# Patient Record
Sex: FEMALE | Race: WHITE
[De-identification: names, ages, dates, MRNs, and addresses within clinical notes are randomized per-mention and may not be internally consistent; named-entity substitution may affect disease eponyms.]

---
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
## 2020-01-09 ENCOUNTER — TRANSCRIPTION ENCOUNTER (OUTPATIENT)
Age: 20
End: 2020-01-09

## 2020-02-27 ENCOUNTER — TRANSCRIPTION ENCOUNTER (OUTPATIENT)
Age: 20
End: 2020-02-27

## 2020-04-16 NOTE — H&P PEDIATRIC - HISTORY OF PRESENT ILLNESS
15 y/o F with no PMHx presents to the ED c/o worsening R sided abd pain over the past 2 days. Pt states that she has been eating, drinking with no worsening of the pain.no diarrhea, no dysuria, no fever, no hematuria, no nausea, no vomiting.
Unknown

## 2020-09-25 NOTE — PRE-OP CHECKLIST, PEDIATRIC - NEEDLE GAUGE
[de-identified] : 58 yo female with fever, llq abdominal pain for the past few days.  Now fever resolved and she took fiber and reports large bm this am.  States pain is much improved. She did has some constipation prior to this pain.  Was seen in urgent care and tested negative for covid. Denies hx of diverticulitis in past.  Last colonoscopy was in 2015. 
20

## 2021-04-07 ENCOUNTER — APPOINTMENT (OUTPATIENT)
Dept: OTOLARYNGOLOGY | Facility: CLINIC | Age: 21
End: 2021-04-07

## 2021-04-07 PROBLEM — N83.209 UNSPECIFIED OVARIAN CYST, UNSPECIFIED SIDE: Chronic | Status: ACTIVE | Noted: 2019-08-18

## 2021-04-07 PROBLEM — I34.1 NONRHEUMATIC MITRAL (VALVE) PROLAPSE: Chronic | Status: ACTIVE | Noted: 2019-08-18

## 2021-04-09 ENCOUNTER — APPOINTMENT (OUTPATIENT)
Dept: OTOLARYNGOLOGY | Facility: CLINIC | Age: 21
End: 2021-04-09

## 2021-04-12 ENCOUNTER — APPOINTMENT (OUTPATIENT)
Dept: CT IMAGING | Facility: CLINIC | Age: 21
End: 2021-04-12
Payer: COMMERCIAL

## 2021-04-12 ENCOUNTER — OUTPATIENT (OUTPATIENT)
Dept: OUTPATIENT SERVICES | Facility: HOSPITAL | Age: 21
LOS: 1 days | End: 2021-04-12
Payer: COMMERCIAL

## 2021-04-12 ENCOUNTER — TRANSCRIPTION ENCOUNTER (OUTPATIENT)
Age: 21
End: 2021-04-12

## 2021-04-12 DIAGNOSIS — Z00.8 ENCOUNTER FOR OTHER GENERAL EXAMINATION: ICD-10-CM

## 2021-04-12 DIAGNOSIS — Z90.49 ACQUIRED ABSENCE OF OTHER SPECIFIED PARTS OF DIGESTIVE TRACT: Chronic | ICD-10-CM

## 2021-04-12 PROCEDURE — 70486 CT MAXILLOFACIAL W/O DYE: CPT

## 2021-04-12 PROCEDURE — 70486 CT MAXILLOFACIAL W/O DYE: CPT | Mod: 26

## 2021-04-16 ENCOUNTER — APPOINTMENT (OUTPATIENT)
Dept: CT IMAGING | Facility: CLINIC | Age: 21
End: 2021-04-16

## 2021-04-16 ENCOUNTER — OUTPATIENT (OUTPATIENT)
Dept: OUTPATIENT SERVICES | Facility: HOSPITAL | Age: 21
LOS: 1 days | End: 2021-04-16

## 2021-04-16 DIAGNOSIS — Z90.49 ACQUIRED ABSENCE OF OTHER SPECIFIED PARTS OF DIGESTIVE TRACT: Chronic | ICD-10-CM

## 2021-04-16 DIAGNOSIS — Z00.8 ENCOUNTER FOR OTHER GENERAL EXAMINATION: ICD-10-CM

## 2021-08-03 ENCOUNTER — APPOINTMENT (OUTPATIENT)
Dept: INTERNAL MEDICINE | Facility: CLINIC | Age: 21
End: 2021-08-03

## 2021-10-08 ENCOUNTER — TRANSCRIPTION ENCOUNTER (OUTPATIENT)
Age: 21
End: 2021-10-08

## 2022-04-26 ENCOUNTER — TRANSCRIPTION ENCOUNTER (OUTPATIENT)
Age: 22
End: 2022-04-26

## 2022-05-03 ENCOUNTER — APPOINTMENT (OUTPATIENT)
Dept: ULTRASOUND IMAGING | Facility: CLINIC | Age: 22
End: 2022-05-03
Payer: COMMERCIAL

## 2022-05-03 ENCOUNTER — OUTPATIENT (OUTPATIENT)
Dept: OUTPATIENT SERVICES | Facility: HOSPITAL | Age: 22
LOS: 1 days | End: 2022-05-03
Payer: COMMERCIAL

## 2022-05-03 DIAGNOSIS — Z00.8 ENCOUNTER FOR OTHER GENERAL EXAMINATION: ICD-10-CM

## 2022-05-03 DIAGNOSIS — Z90.49 ACQUIRED ABSENCE OF OTHER SPECIFIED PARTS OF DIGESTIVE TRACT: Chronic | ICD-10-CM

## 2022-05-03 PROCEDURE — 76817 TRANSVAGINAL US OBSTETRIC: CPT

## 2022-05-03 PROCEDURE — 76801 OB US < 14 WKS SINGLE FETUS: CPT

## 2022-05-03 PROCEDURE — 76801 OB US < 14 WKS SINGLE FETUS: CPT | Mod: 26

## 2022-05-03 PROCEDURE — 76817 TRANSVAGINAL US OBSTETRIC: CPT | Mod: 26

## 2022-05-04 ENCOUNTER — APPOINTMENT (OUTPATIENT)
Dept: ULTRASOUND IMAGING | Facility: CLINIC | Age: 22
End: 2022-05-04

## 2022-06-15 NOTE — DISCHARGE NOTE ADULT - NS MD DC FALL RISK RISK
For information on Fall & Injury Prevention, visit [email or web address] Mustarde Flap Text: The defect edges were debeveled with a #15 scalpel blade.  Given the size, depth and location of the defect and the proximity to free margins a Mustarde flap was deemed most appropriate.  Using a sterile surgical marker, an appropriate flap was drawn incorporating the defect. The area thus outlined was incised with a #15 scalpel blade.  The skin margins were undermined to an appropriate distance in all directions utilizing iris scissors.

## 2022-09-26 ENCOUNTER — OUTPATIENT (OUTPATIENT)
Dept: OUTPATIENT SERVICES | Facility: HOSPITAL | Age: 22
LOS: 1 days | End: 2022-09-26
Payer: COMMERCIAL

## 2022-09-26 ENCOUNTER — APPOINTMENT (OUTPATIENT)
Dept: ULTRASOUND IMAGING | Facility: CLINIC | Age: 22
End: 2022-09-26

## 2022-09-26 DIAGNOSIS — R00.0 TACHYCARDIA, UNSPECIFIED: ICD-10-CM

## 2022-09-26 DIAGNOSIS — Z90.49 ACQUIRED ABSENCE OF OTHER SPECIFIED PARTS OF DIGESTIVE TRACT: Chronic | ICD-10-CM

## 2022-09-26 PROCEDURE — 76536 US EXAM OF HEAD AND NECK: CPT

## 2022-09-26 PROCEDURE — 76536 US EXAM OF HEAD AND NECK: CPT | Mod: 26

## 2023-03-15 ENCOUNTER — APPOINTMENT (OUTPATIENT)
Dept: ORTHOPEDIC SURGERY | Facility: CLINIC | Age: 23
End: 2023-03-15

## 2023-03-16 ENCOUNTER — APPOINTMENT (OUTPATIENT)
Dept: ORTHOPEDIC SURGERY | Facility: CLINIC | Age: 23
End: 2023-03-16

## 2023-05-06 ENCOUNTER — APPOINTMENT (OUTPATIENT)
Dept: ULTRASOUND IMAGING | Facility: CLINIC | Age: 23
End: 2023-05-06
Payer: COMMERCIAL

## 2023-05-06 ENCOUNTER — OUTPATIENT (OUTPATIENT)
Dept: OUTPATIENT SERVICES | Facility: HOSPITAL | Age: 23
LOS: 1 days | End: 2023-05-06
Payer: COMMERCIAL

## 2023-05-06 DIAGNOSIS — Z90.49 ACQUIRED ABSENCE OF OTHER SPECIFIED PARTS OF DIGESTIVE TRACT: Chronic | ICD-10-CM

## 2023-05-06 DIAGNOSIS — E04.1 NONTOXIC SINGLE THYROID NODULE: ICD-10-CM

## 2023-05-06 PROCEDURE — 76536 US EXAM OF HEAD AND NECK: CPT

## 2023-05-06 PROCEDURE — 76536 US EXAM OF HEAD AND NECK: CPT | Mod: 26

## 2023-05-12 NOTE — DISCHARGE NOTE ADULT - LAUNCH MEDICATION RECONCILIATION
Tube check 5/26 at 0900 AM at [facility] [address] going instructions for Post CT/Ultrasound  Biopsy/Aspiration/Drainage    No tub baths, hot tubs or swimming for 5 days  May shower after 24 hours  No strenuous activity for 48 hours  Notify doctor if bleeding, increased pain at the site, swelling, bruising, redness or fever  May change Band-Aid daily for 3 days then leave open to air    Additional home going instructions for Drainage Tube  Post CT/Ultrasound  Biopsy/Aspritation/Drainage    Pour 0.9 NaCL solution into sterile specimen cup. Use new cup everyday  Draw up 10 mL of fluid into syringe. New syringe for each flush. Avoid touching the inside of the cup, mouth of the bottle, tip of the syringe or tip of white cap as these areas are to be kept sterile. Change white cap as necessary. Tube is to be flushed daily  Remove cap from side port and connect syringe by twisting motion  Turn stopcock off towards bag. (This allows direct flow between catheter in patient and bag.)  Inject the solution brisky. Pull fluid back slowly but firmly into the syringe  Measure amount of fluid and observe what fluid looks like. Document at completion of this procedure. Turn stopcock off towards side port. (This allows direct flow between catheter in patient and bag)  Remove the syringe and replace cap over side port. Fluid in syringe and leftover solution in bottle cap should be discarded into toilet. Replace lid to cup. Discard syringe into old milk carton/jug and consuelo contaminated for .     Use new syringe each time the flushing procedure is done  Use sterile cup each day  Clear plastic cover is to be used when showering or bathing, not for daily continuous wear      Secure prescription for follow up tube change  Follow up with [facility] appointment        Patient/Family Member Signature: __________________________________      Physician/RN Signature: <<-----Click here for Discharge Medication Review

## 2023-09-10 ENCOUNTER — APPOINTMENT (OUTPATIENT)
Dept: MRI IMAGING | Facility: CLINIC | Age: 23
End: 2023-09-10
Payer: COMMERCIAL

## 2023-09-10 ENCOUNTER — APPOINTMENT (OUTPATIENT)
Dept: ORTHOPEDIC SURGERY | Facility: CLINIC | Age: 23
End: 2023-09-10
Payer: COMMERCIAL

## 2023-09-10 VITALS — BODY MASS INDEX: 20.66 KG/M2 | HEIGHT: 65 IN | WEIGHT: 124 LBS

## 2023-09-10 DIAGNOSIS — M25.562 PAIN IN LEFT KNEE: ICD-10-CM

## 2023-09-10 PROCEDURE — 73562 X-RAY EXAM OF KNEE 3: CPT | Mod: LT

## 2023-09-10 PROCEDURE — 73721 MRI JNT OF LWR EXTRE W/O DYE: CPT | Mod: LT

## 2023-09-10 PROCEDURE — 99204 OFFICE O/P NEW MOD 45 MIN: CPT | Mod: 25

## 2023-09-10 PROCEDURE — E0114: CPT | Mod: NU

## 2023-09-10 RX ORDER — NAPROXEN 500 MG/1
500 TABLET ORAL
Qty: 60 | Refills: 1 | Status: ACTIVE | COMMUNITY
Start: 2023-09-10 | End: 1900-01-01

## 2023-09-10 NOTE — IMAGING
[de-identified] : The patient is a well appearing 22 year old female of their stated age. Patient ambulates with stiff knee normal gait. Negative straight leg raise bilateral  Effected Knee: Left                        	 ROM:   degrees Lachman: Negative Pivot Shift: Negative Anterior Drawer: Negative Posterior Drawer / Sag:Negative Varus Stress 0 degrees: Stable Varus Stress 30 degrees: Stable Valgus Stress 0 degrees: Stable Valgus Stress 30 degrees: Stable Medial Javier: + Lateral Javier: Negative Patella Glide: 2+ Patella Apprehension: Negative Patella Grind: Negative Palpation: Medial Joint Line: Tender Lateral Joint Line: Nontender Medial Collateral Ligament: Nontender Lateral Collateral Ligament/PLC: Nontender Distal Femur: Nontender Proximal Tibia: Nontender Tibial Tubercle: Nontender Distal Pole Patella: Nontender Quadriceps Tendon: Tender &  Intact Patella Tendon: Nontender &  Intact Medial Distal Hamstring/PES: Nontender Lateral Distal Hamstring: Nontender & Stable Iliotibial Band: Nontender Medial Patellofemoral Ligament: Nontender Adductor: Nontender Proximal GSC-Plantaris: Nontender Calf: Supple & Nontender Inspection: Deformity: No Erythema: No Ecchymosis: No Abrasions: No Effusion: Mild Prepatella Bursitis: No Neurologic Exam: Sensation L4-S1: Grossly Intact Motor Exam: Quadriceps: 3 out of 5 Hamstrings: 5 out of 5 EHL: 5 out of 5 FHL: 5 out of 5 TA: 5 out of 5 GS: 5 out of 5 Circulatory/Pulses: Dorsalis Pedis: 2+ Posterior Tibialis: 2+ Additional Pertinent Findings: None Contralateral Knee:                           	 ROM: 0-145 degrees Other Pertinent Findings: None  Assessment: The patient is a 22 year old female with left knee pain and radiographic and physical exam findings consistent with   an acute locked knee, quadriceps tendinitis and possible medial meniscus tear.    The patients condition is acute. Documents/Results Reviewed Today: None Tests/Studies Independently Interpreted Today: Xrays benign Pertinent findings include: TTP medial joint line, + MM, + ttp quad/VMO, loss of motion Confounding medical conditions/concerns: None Plan:  Crutches for WB, STAT MRI Tests Ordered: STAT MRI left knee to eval for locked meniscus tear Prescription Medications Ordered: Naprosyn Braces/DME Ordered: Crutches Activity/Work/Sports Status: Limited Additional Instructions: Ice and elevate Follow-Up: After MRI  The patient's current medication management of their orthopedic diagnosis was reviewed today: (1) We discussed a comprehensive treatment plan that included possible pharmaceutical management involving the use of prescription strength medications including but not limited to options such as oral Naprosyn 500mg BID, once daily Meloxicam 15 mg, or 500-650 mg Tylenol versus over the counter oral medications and topical prescription NSAID Pennsaid vs over the counter Voltaren gel.  Based on our extensive discussion, the patient was prescribed Naprosyn 500mg BID for two weeks.  It will then be used PRN for pain, inflammation and discomfort. (2) There is a moderate risk of morbidity with further treatment, especially from use of prescription strength medications and possible side effects of these medications which include upset stomach with oral medications, skin reactions to topical medications and cardiac/renal issues with long term use. (3) I recommended that the patient follow-up with their medical physician to discuss any significant specific potential issues with long term medication use such as interactions with current medications or with exacerbation of underlying medical comorbidities. (4) The benefits and risks associated with use of injectable, oral or topical, prescription and over the counter anti-inflammatory medications were discussed with the patient. The patient voiced understanding of the risks including but not limited to bleeding, stroke, kidney dysfunction, heart disease, and were referred to the black box warning label for further information.   [Left] : left knee [AP] : anteroposterior [Lateral] : lateral [[place]] : skyline [There are no fractures, subluxations or dislocations. No significant abnormalities are seen] : There are no fractures, subluxations or dislocations. No significant abnormalities are seen

## 2023-09-10 NOTE — HISTORY OF PRESENT ILLNESS
[de-identified] : The patient is a 22 year  old right hand dominant female who presents today complaining of left knee pain.   Date of Injury/Onset: 9/9/23 Pain:    At Rest: 4/10  With Activity:  8-9/10  Mechanism of injury: went to stand up after prolonged sitting and felt pain This is NOT a Work Related Injury being treated under Worker's Compensation. This is NOT an athletic injury occurring associated with an interscholastic or organized sports team. Quality of symptoms: stabbing, instability, sharp Improves with: rest Worse with: walking, standing, terminal knee extensinon Prior treatment: none Prior Imaging: none Out of work/sport: currently working and going to school School/Sport/Position/Occupation: student at [employer] Additional Information: HX: left patella fracture

## 2023-09-12 ENCOUNTER — NON-APPOINTMENT (OUTPATIENT)
Age: 23
End: 2023-09-12

## 2023-09-12 NOTE — ED ADULT TRIAGE NOTE - DIRECT TO ROOM CARE INITIATED:
----- Message from Maricruz Gomez sent at 9/12/2023 10:10 AM EDT -----  Subject: Referral Request    Reason for referral request? Patient requests labs for to check for   rheumatoid arthritis. Ortho doctor advised to check with PCP to have done. Provider patient wants to be referred to(if known):     Provider Phone Number(if known):     Additional Information for Provider?   ---------------------------------------------------------------------------  --------------  [address]    [phone]; OK to leave message on voicemail  ---------------------------------------------------------------------------  --------------
11-Jun-2017 11:10

## 2023-09-14 ENCOUNTER — APPOINTMENT (OUTPATIENT)
Dept: ORTHOPEDIC SURGERY | Facility: CLINIC | Age: 23
End: 2023-09-14

## 2024-02-27 RX ORDER — AMOXICILLIN 250 MG/5ML
1 SUSPENSION, RECONSTITUTED, ORAL (ML) ORAL
Refills: 0 | DISCHARGE
Start: 2024-02-27 | End: 2024-03-08

## 2024-03-01 ENCOUNTER — INPATIENT (INPATIENT)
Facility: HOSPITAL | Age: 24
LOS: 1 days | Discharge: HOME | DRG: 392 | End: 2024-03-03
Attending: STUDENT IN AN ORGANIZED HEALTH CARE EDUCATION/TRAINING PROGRAM | Admitting: INTERNAL MEDICINE
Payer: COMMERCIAL

## 2024-03-01 VITALS
WEIGHT: 130.95 LBS | OXYGEN SATURATION: 96 % | HEART RATE: 113 BPM | HEIGHT: 65 IN | DIASTOLIC BLOOD PRESSURE: 77 MMHG | SYSTOLIC BLOOD PRESSURE: 133 MMHG | TEMPERATURE: 98 F | RESPIRATION RATE: 19 BRPM

## 2024-03-01 DIAGNOSIS — Z98.891 HISTORY OF UTERINE SCAR FROM PREVIOUS SURGERY: Chronic | ICD-10-CM

## 2024-03-01 DIAGNOSIS — R55 SYNCOPE AND COLLAPSE: ICD-10-CM

## 2024-03-01 DIAGNOSIS — Z90.49 ACQUIRED ABSENCE OF OTHER SPECIFIED PARTS OF DIGESTIVE TRACT: Chronic | ICD-10-CM

## 2024-03-01 LAB
ADD ON TEST-SPECIMEN IN LAB: SIGNIFICANT CHANGE UP
ADD ON TEST-SPECIMEN IN LAB: SIGNIFICANT CHANGE UP
ALBUMIN SERPL ELPH-MCNC: 2.8 G/DL — LOW (ref 3.3–5)
ALBUMIN SERPL ELPH-MCNC: 3.6 G/DL — SIGNIFICANT CHANGE UP (ref 3.3–5)
ALP SERPL-CCNC: 59 U/L — SIGNIFICANT CHANGE UP (ref 40–120)
ALP SERPL-CCNC: 86 U/L — SIGNIFICANT CHANGE UP (ref 40–120)
ALT FLD-CCNC: 31 U/L — SIGNIFICANT CHANGE UP (ref 12–78)
ALT FLD-CCNC: 36 U/L — SIGNIFICANT CHANGE UP (ref 12–78)
AMPHET UR-MCNC: NEGATIVE — SIGNIFICANT CHANGE UP
ANION GAP SERPL CALC-SCNC: 4 MMOL/L — LOW (ref 5–17)
ANION GAP SERPL CALC-SCNC: 6 MMOL/L — SIGNIFICANT CHANGE UP (ref 5–17)
APPEARANCE UR: CLEAR — SIGNIFICANT CHANGE UP
AST SERPL-CCNC: 14 U/L — LOW (ref 15–37)
AST SERPL-CCNC: 25 U/L — SIGNIFICANT CHANGE UP (ref 15–37)
BARBITURATES UR SCN-MCNC: NEGATIVE — SIGNIFICANT CHANGE UP
BASOPHILS # BLD AUTO: 0.02 K/UL — SIGNIFICANT CHANGE UP (ref 0–0.2)
BASOPHILS # BLD AUTO: 0.05 K/UL — SIGNIFICANT CHANGE UP (ref 0–0.2)
BASOPHILS NFR BLD AUTO: 0.3 % — SIGNIFICANT CHANGE UP (ref 0–2)
BASOPHILS NFR BLD AUTO: 0.3 % — SIGNIFICANT CHANGE UP (ref 0–2)
BENZODIAZ UR-MCNC: NEGATIVE — SIGNIFICANT CHANGE UP
BILIRUB SERPL-MCNC: 0.3 MG/DL — SIGNIFICANT CHANGE UP (ref 0.2–1.2)
BILIRUB SERPL-MCNC: 0.4 MG/DL — SIGNIFICANT CHANGE UP (ref 0.2–1.2)
BILIRUB UR-MCNC: NEGATIVE — SIGNIFICANT CHANGE UP
BUN SERPL-MCNC: 15 MG/DL — SIGNIFICANT CHANGE UP (ref 7–23)
BUN SERPL-MCNC: 21 MG/DL — SIGNIFICANT CHANGE UP (ref 7–23)
CALCIUM SERPL-MCNC: 6.9 MG/DL — LOW (ref 8.5–10.1)
CALCIUM SERPL-MCNC: 8.8 MG/DL — SIGNIFICANT CHANGE UP (ref 8.5–10.1)
CHLORIDE SERPL-SCNC: 108 MMOL/L — SIGNIFICANT CHANGE UP (ref 96–108)
CHLORIDE SERPL-SCNC: 113 MMOL/L — HIGH (ref 96–108)
CO2 SERPL-SCNC: 23 MMOL/L — SIGNIFICANT CHANGE UP (ref 22–31)
CO2 SERPL-SCNC: 26 MMOL/L — SIGNIFICANT CHANGE UP (ref 22–31)
COCAINE METAB.OTHER UR-MCNC: NEGATIVE — SIGNIFICANT CHANGE UP
COLOR SPEC: YELLOW — SIGNIFICANT CHANGE UP
CREAT SERPL-MCNC: 0.56 MG/DL — SIGNIFICANT CHANGE UP (ref 0.5–1.3)
CREAT SERPL-MCNC: 0.61 MG/DL — SIGNIFICANT CHANGE UP (ref 0.5–1.3)
D DIMER BLD IA.RAPID-MCNC: <150 NG/ML DDU — SIGNIFICANT CHANGE UP
DIFF PNL FLD: NEGATIVE — SIGNIFICANT CHANGE UP
EGFR: 129 ML/MIN/1.73M2 — SIGNIFICANT CHANGE UP
EGFR: 131 ML/MIN/1.73M2 — SIGNIFICANT CHANGE UP
EOSINOPHIL # BLD AUTO: 0.01 K/UL — SIGNIFICANT CHANGE UP (ref 0–0.5)
EOSINOPHIL # BLD AUTO: 0.05 K/UL — SIGNIFICANT CHANGE UP (ref 0–0.5)
EOSINOPHIL NFR BLD AUTO: 0.1 % — SIGNIFICANT CHANGE UP (ref 0–6)
EOSINOPHIL NFR BLD AUTO: 0.3 % — SIGNIFICANT CHANGE UP (ref 0–6)
FLUAV AG NPH QL: SIGNIFICANT CHANGE UP
FLUBV AG NPH QL: SIGNIFICANT CHANGE UP
GLUCOSE SERPL-MCNC: 139 MG/DL — HIGH (ref 70–99)
GLUCOSE SERPL-MCNC: 98 MG/DL — SIGNIFICANT CHANGE UP (ref 70–99)
GLUCOSE UR QL: NEGATIVE MG/DL — SIGNIFICANT CHANGE UP
HCG SERPL-ACNC: <1 MIU/ML — SIGNIFICANT CHANGE UP
HCT VFR BLD CALC: 35.8 % — SIGNIFICANT CHANGE UP (ref 34.5–45)
HCT VFR BLD CALC: 39.6 % — SIGNIFICANT CHANGE UP (ref 34.5–45)
HGB BLD-MCNC: 11.7 G/DL — SIGNIFICANT CHANGE UP (ref 11.5–15.5)
HGB BLD-MCNC: 13.5 G/DL — SIGNIFICANT CHANGE UP (ref 11.5–15.5)
IMM GRANULOCYTES NFR BLD AUTO: 0.3 % — SIGNIFICANT CHANGE UP (ref 0–0.9)
IMM GRANULOCYTES NFR BLD AUTO: 0.4 % — SIGNIFICANT CHANGE UP (ref 0–0.9)
KETONES UR-MCNC: NEGATIVE MG/DL — SIGNIFICANT CHANGE UP
LACTATE SERPL-SCNC: 0.9 MMOL/L — SIGNIFICANT CHANGE UP (ref 0.7–2)
LEUKOCYTE ESTERASE UR-ACNC: NEGATIVE — SIGNIFICANT CHANGE UP
LYMPHOCYTES # BLD AUTO: 0.32 K/UL — LOW (ref 1–3.3)
LYMPHOCYTES # BLD AUTO: 0.54 K/UL — LOW (ref 1–3.3)
LYMPHOCYTES # BLD AUTO: 3.4 % — LOW (ref 13–44)
LYMPHOCYTES # BLD AUTO: 4.2 % — LOW (ref 13–44)
MAGNESIUM SERPL-MCNC: 1.8 MG/DL — SIGNIFICANT CHANGE UP (ref 1.6–2.6)
MCHC RBC-ENTMCNC: 29.3 PG — SIGNIFICANT CHANGE UP (ref 27–34)
MCHC RBC-ENTMCNC: 29.7 PG — SIGNIFICANT CHANGE UP (ref 27–34)
MCHC RBC-ENTMCNC: 32.7 GM/DL — SIGNIFICANT CHANGE UP (ref 32–36)
MCHC RBC-ENTMCNC: 34.1 GM/DL — SIGNIFICANT CHANGE UP (ref 32–36)
MCV RBC AUTO: 87 FL — SIGNIFICANT CHANGE UP (ref 80–100)
MCV RBC AUTO: 89.5 FL — SIGNIFICANT CHANGE UP (ref 80–100)
METHADONE UR-MCNC: NEGATIVE — SIGNIFICANT CHANGE UP
MONOCYTES # BLD AUTO: 0.19 K/UL — SIGNIFICANT CHANGE UP (ref 0–0.9)
MONOCYTES # BLD AUTO: 0.49 K/UL — SIGNIFICANT CHANGE UP (ref 0–0.9)
MONOCYTES NFR BLD AUTO: 2.5 % — SIGNIFICANT CHANGE UP (ref 2–14)
MONOCYTES NFR BLD AUTO: 3.1 % — SIGNIFICANT CHANGE UP (ref 2–14)
NEUTROPHILS # BLD AUTO: 14.66 K/UL — HIGH (ref 1.8–7.4)
NEUTROPHILS # BLD AUTO: 7 K/UL — SIGNIFICANT CHANGE UP (ref 1.8–7.4)
NEUTROPHILS NFR BLD AUTO: 92.5 % — HIGH (ref 43–77)
NEUTROPHILS NFR BLD AUTO: 92.6 % — HIGH (ref 43–77)
NITRITE UR-MCNC: NEGATIVE — SIGNIFICANT CHANGE UP
OPIATES UR-MCNC: NEGATIVE — SIGNIFICANT CHANGE UP
PCP SPEC-MCNC: SIGNIFICANT CHANGE UP
PCP UR-MCNC: NEGATIVE — SIGNIFICANT CHANGE UP
PH UR: 5 — SIGNIFICANT CHANGE UP (ref 5–8)
PLATELET # BLD AUTO: 261 K/UL — SIGNIFICANT CHANGE UP (ref 150–400)
PLATELET # BLD AUTO: 343 K/UL — SIGNIFICANT CHANGE UP (ref 150–400)
POTASSIUM SERPL-MCNC: 3.6 MMOL/L — SIGNIFICANT CHANGE UP (ref 3.5–5.3)
POTASSIUM SERPL-MCNC: 3.9 MMOL/L — SIGNIFICANT CHANGE UP (ref 3.5–5.3)
POTASSIUM SERPL-SCNC: 3.6 MMOL/L — SIGNIFICANT CHANGE UP (ref 3.5–5.3)
POTASSIUM SERPL-SCNC: 3.9 MMOL/L — SIGNIFICANT CHANGE UP (ref 3.5–5.3)
PROT SERPL-MCNC: 5.8 GM/DL — LOW (ref 6–8.3)
PROT SERPL-MCNC: 7.4 GM/DL — SIGNIFICANT CHANGE UP (ref 6–8.3)
PROT UR-MCNC: NEGATIVE MG/DL — SIGNIFICANT CHANGE UP
RBC # BLD: 4 M/UL — SIGNIFICANT CHANGE UP (ref 3.8–5.2)
RBC # BLD: 4.55 M/UL — SIGNIFICANT CHANGE UP (ref 3.8–5.2)
RBC # FLD: 12.9 % — SIGNIFICANT CHANGE UP (ref 10.3–14.5)
RBC # FLD: 12.9 % — SIGNIFICANT CHANGE UP (ref 10.3–14.5)
RSV RNA NPH QL NAA+NON-PROBE: SIGNIFICANT CHANGE UP
SARS-COV-2 RNA SPEC QL NAA+PROBE: SIGNIFICANT CHANGE UP
SODIUM SERPL-SCNC: 138 MMOL/L — SIGNIFICANT CHANGE UP (ref 135–145)
SODIUM SERPL-SCNC: 142 MMOL/L — SIGNIFICANT CHANGE UP (ref 135–145)
SP GR SPEC: 1.03 — SIGNIFICANT CHANGE UP (ref 1–1.03)
THC UR QL: NEGATIVE — SIGNIFICANT CHANGE UP
TROPONIN I, HIGH SENSITIVITY RESULT: 3.09 NG/L — SIGNIFICANT CHANGE UP
TROPONIN I, HIGH SENSITIVITY RESULT: <3 NG/L — SIGNIFICANT CHANGE UP
UROBILINOGEN FLD QL: 0.2 MG/DL — SIGNIFICANT CHANGE UP (ref 0.2–1)
WBC # BLD: 15.84 K/UL — HIGH (ref 3.8–10.5)
WBC # BLD: 7.57 K/UL — SIGNIFICANT CHANGE UP (ref 3.8–10.5)
WBC # FLD AUTO: 15.84 K/UL — HIGH (ref 3.8–10.5)
WBC # FLD AUTO: 7.57 K/UL — SIGNIFICANT CHANGE UP (ref 3.8–10.5)

## 2024-03-01 PROCEDURE — 80048 BASIC METABOLIC PNL TOTAL CA: CPT

## 2024-03-01 PROCEDURE — 87507 IADNA-DNA/RNA PROBE TQ 12-25: CPT

## 2024-03-01 PROCEDURE — 99285 EMERGENCY DEPT VISIT HI MDM: CPT

## 2024-03-01 PROCEDURE — 71045 X-RAY EXAM CHEST 1 VIEW: CPT | Mod: 26

## 2024-03-01 PROCEDURE — 87040 BLOOD CULTURE FOR BACTERIA: CPT

## 2024-03-01 PROCEDURE — 73562 X-RAY EXAM OF KNEE 3: CPT | Mod: 26,RT

## 2024-03-01 PROCEDURE — 93005 ELECTROCARDIOGRAM TRACING: CPT

## 2024-03-01 PROCEDURE — 99223 1ST HOSP IP/OBS HIGH 75: CPT

## 2024-03-01 PROCEDURE — 83605 ASSAY OF LACTIC ACID: CPT

## 2024-03-01 PROCEDURE — 72125 CT NECK SPINE W/O DYE: CPT | Mod: 26,MC

## 2024-03-01 PROCEDURE — 70450 CT HEAD/BRAIN W/O DYE: CPT | Mod: 26,MC

## 2024-03-01 PROCEDURE — 36415 COLL VENOUS BLD VENIPUNCTURE: CPT

## 2024-03-01 PROCEDURE — 71260 CT THORAX DX C+: CPT | Mod: 26,MC

## 2024-03-01 PROCEDURE — 72170 X-RAY EXAM OF PELVIS: CPT | Mod: 26

## 2024-03-01 PROCEDURE — 84100 ASSAY OF PHOSPHORUS: CPT

## 2024-03-01 PROCEDURE — 87493 C DIFF AMPLIFIED PROBE: CPT

## 2024-03-01 PROCEDURE — 80053 COMPREHEN METABOLIC PANEL: CPT

## 2024-03-01 PROCEDURE — 93010 ELECTROCARDIOGRAM REPORT: CPT | Mod: 76

## 2024-03-01 PROCEDURE — 73630 X-RAY EXAM OF FOOT: CPT | Mod: 26,RT

## 2024-03-01 PROCEDURE — 74177 CT ABD & PELVIS W/CONTRAST: CPT | Mod: 26,MC

## 2024-03-01 PROCEDURE — 83735 ASSAY OF MAGNESIUM: CPT

## 2024-03-01 RX ORDER — FERROUS SULFATE 325(65) MG
325 TABLET ORAL DAILY
Refills: 0 | Status: DISCONTINUED | OUTPATIENT
Start: 2024-03-01 | End: 2024-03-03

## 2024-03-01 RX ORDER — CIPROFLOXACIN LACTATE 400MG/40ML
400 VIAL (ML) INTRAVENOUS EVERY 12 HOURS
Refills: 0 | Status: DISCONTINUED | OUTPATIENT
Start: 2024-03-02 | End: 2024-03-02

## 2024-03-01 RX ORDER — METRONIDAZOLE 500 MG
500 TABLET ORAL EVERY 8 HOURS
Refills: 0 | Status: DISCONTINUED | OUTPATIENT
Start: 2024-03-01 | End: 2024-03-02

## 2024-03-01 RX ORDER — METRONIDAZOLE 500 MG
TABLET ORAL
Refills: 0 | Status: DISCONTINUED | OUTPATIENT
Start: 2024-03-01 | End: 2024-03-02

## 2024-03-01 RX ORDER — ONDANSETRON 8 MG/1
4 TABLET, FILM COATED ORAL EVERY 8 HOURS
Refills: 0 | Status: DISCONTINUED | OUTPATIENT
Start: 2024-03-01 | End: 2024-03-03

## 2024-03-01 RX ORDER — FERROUS SULFATE 325(65) MG
1 TABLET ORAL
Refills: 0 | DISCHARGE

## 2024-03-01 RX ORDER — SODIUM CHLORIDE 9 MG/ML
1000 INJECTION INTRAMUSCULAR; INTRAVENOUS; SUBCUTANEOUS ONCE
Refills: 0 | Status: COMPLETED | OUTPATIENT
Start: 2024-03-01 | End: 2024-03-01

## 2024-03-01 RX ORDER — LACTOBACILLUS ACIDOPHILUS 100MM CELL
1 CAPSULE ORAL
Refills: 0 | DISCHARGE

## 2024-03-01 RX ORDER — KETOROLAC TROMETHAMINE 30 MG/ML
30 SYRINGE (ML) INJECTION ONCE
Refills: 0 | Status: DISCONTINUED | OUTPATIENT
Start: 2024-03-01 | End: 2024-03-01

## 2024-03-01 RX ORDER — ONDANSETRON 8 MG/1
4 TABLET, FILM COATED ORAL ONCE
Refills: 0 | Status: COMPLETED | OUTPATIENT
Start: 2024-03-01 | End: 2024-03-01

## 2024-03-01 RX ORDER — SODIUM CHLORIDE 9 MG/ML
1000 INJECTION INTRAMUSCULAR; INTRAVENOUS; SUBCUTANEOUS
Refills: 0 | Status: DISCONTINUED | OUTPATIENT
Start: 2024-03-01 | End: 2024-03-03

## 2024-03-01 RX ORDER — CIPROFLOXACIN LACTATE 400MG/40ML
400 VIAL (ML) INTRAVENOUS ONCE
Refills: 0 | Status: COMPLETED | OUTPATIENT
Start: 2024-03-01 | End: 2024-03-01

## 2024-03-01 RX ORDER — METOCLOPRAMIDE HCL 10 MG
10 TABLET ORAL ONCE
Refills: 0 | Status: COMPLETED | OUTPATIENT
Start: 2024-03-01 | End: 2024-03-01

## 2024-03-01 RX ORDER — CYCLOBENZAPRINE HYDROCHLORIDE 10 MG/1
10 TABLET, FILM COATED ORAL ONCE
Refills: 0 | Status: COMPLETED | OUTPATIENT
Start: 2024-03-01 | End: 2024-03-01

## 2024-03-01 RX ORDER — CIPROFLOXACIN LACTATE 400MG/40ML
VIAL (ML) INTRAVENOUS
Refills: 0 | Status: DISCONTINUED | OUTPATIENT
Start: 2024-03-01 | End: 2024-03-02

## 2024-03-01 RX ORDER — SODIUM CHLORIDE 9 MG/ML
2000 INJECTION INTRAMUSCULAR; INTRAVENOUS; SUBCUTANEOUS ONCE
Refills: 0 | Status: COMPLETED | OUTPATIENT
Start: 2024-03-01 | End: 2024-03-01

## 2024-03-01 RX ORDER — ACETAMINOPHEN 500 MG
650 TABLET ORAL EVERY 6 HOURS
Refills: 0 | Status: DISCONTINUED | OUTPATIENT
Start: 2024-03-01 | End: 2024-03-03

## 2024-03-01 RX ORDER — ACETAMINOPHEN 500 MG
1000 TABLET ORAL ONCE
Refills: 0 | Status: COMPLETED | OUTPATIENT
Start: 2024-03-01 | End: 2024-03-01

## 2024-03-01 RX ORDER — METRONIDAZOLE 500 MG
500 TABLET ORAL ONCE
Refills: 0 | Status: COMPLETED | OUTPATIENT
Start: 2024-03-01 | End: 2024-03-01

## 2024-03-01 RX ORDER — LANOLIN ALCOHOL/MO/W.PET/CERES
3 CREAM (GRAM) TOPICAL AT BEDTIME
Refills: 0 | Status: DISCONTINUED | OUTPATIENT
Start: 2024-03-01 | End: 2024-03-03

## 2024-03-01 RX ADMIN — SODIUM CHLORIDE 1000 MILLILITER(S): 9 INJECTION INTRAMUSCULAR; INTRAVENOUS; SUBCUTANEOUS at 05:54

## 2024-03-01 RX ADMIN — Medication 650 MILLIGRAM(S): at 13:58

## 2024-03-01 RX ADMIN — ONDANSETRON 4 MILLIGRAM(S): 8 TABLET, FILM COATED ORAL at 18:19

## 2024-03-01 RX ADMIN — ONDANSETRON 4 MILLIGRAM(S): 8 TABLET, FILM COATED ORAL at 03:06

## 2024-03-01 RX ADMIN — Medication 30 MILLIGRAM(S): at 11:07

## 2024-03-01 RX ADMIN — SODIUM CHLORIDE 100 MILLILITER(S): 9 INJECTION INTRAMUSCULAR; INTRAVENOUS; SUBCUTANEOUS at 18:20

## 2024-03-01 RX ADMIN — Medication 200 MILLIGRAM(S): at 18:17

## 2024-03-01 RX ADMIN — Medication 400 MILLIGRAM(S): at 03:10

## 2024-03-01 RX ADMIN — Medication 650 MILLIGRAM(S): at 23:20

## 2024-03-01 RX ADMIN — Medication 30 MILLIGRAM(S): at 12:18

## 2024-03-01 RX ADMIN — Medication 2 TABLET(S): at 13:59

## 2024-03-01 RX ADMIN — Medication 1000 MILLIGRAM(S): at 11:08

## 2024-03-01 RX ADMIN — SODIUM CHLORIDE 1000 MILLILITER(S): 9 INJECTION INTRAMUSCULAR; INTRAVENOUS; SUBCUTANEOUS at 05:58

## 2024-03-01 RX ADMIN — SODIUM CHLORIDE 100 MILLILITER(S): 9 INJECTION INTRAMUSCULAR; INTRAVENOUS; SUBCUTANEOUS at 11:21

## 2024-03-01 RX ADMIN — Medication 30 MILLIGRAM(S): at 05:06

## 2024-03-01 RX ADMIN — SODIUM CHLORIDE 2000 MILLILITER(S): 9 INJECTION INTRAMUSCULAR; INTRAVENOUS; SUBCUTANEOUS at 03:05

## 2024-03-01 RX ADMIN — ONDANSETRON 4 MILLIGRAM(S): 8 TABLET, FILM COATED ORAL at 11:21

## 2024-03-01 RX ADMIN — Medication 100 MILLIGRAM(S): at 16:35

## 2024-03-01 RX ADMIN — Medication 325 MILLIGRAM(S): at 13:57

## 2024-03-01 RX ADMIN — Medication 100 MILLIGRAM(S): at 21:20

## 2024-03-01 RX ADMIN — Medication 30 MILLIGRAM(S): at 11:21

## 2024-03-01 RX ADMIN — CYCLOBENZAPRINE HYDROCHLORIDE 10 MILLIGRAM(S): 10 TABLET, FILM COATED ORAL at 04:25

## 2024-03-01 RX ADMIN — Medication 10 MILLIGRAM(S): at 07:39

## 2024-03-01 NOTE — ED PROVIDER NOTE - CARE PLAN
Principal Discharge DX:	Syncope  Secondary Diagnosis:	Closed head injury  Secondary Diagnosis:	Contusion of back  Secondary Diagnosis:	Viral gastroenteritis   1

## 2024-03-01 NOTE — ED ADULT NURSE REASSESSMENT NOTE - NS ED NURSE REASSESS COMMENT FT1
Orthostatic blood pressures completed and hypotension upon standing noted. MD Hopson made aware. Pt reported headache upon standing. MD Hopson aware. Orders received.
Pt currently c/o lower back pain and requesting tylenol also states she is nauseous and requesting antinausea medication as well. Pt also states "My lips, face and both of my legs feel tingly right now." No neuro deficit observed, no facial asymmetry, equal  strength bilaterally, no arm/leg drift, positive strength to all four extremities, and positive sensation. MD Pérez made aware, awaiting tylenol and antinausea orders, no other orders at this time.
Pt observed to have -150's on tele monitor, upon reassessment pt found standing and states "I was just sleeping and pooped myself. This has never happened before." MD Pérez made aware, orthostatic vital signs to be performed, no other interventions ordered at this time.
report received from Danisha LOWE. Pt resting comfortably in stretcher. Pt medicated as per MAR and provided bedpan for urine. No other verbal complaints at this time.
Pt observed to be tachycardic to 130's again, pt had another episode of incontinence and is standing up at this time, MD Pérez made aware, no new interventions ordered.

## 2024-03-01 NOTE — ED ADULT NURSE NOTE - NSFALLRISKINTERV_ED_ALL_ED

## 2024-03-01 NOTE — ED PROVIDER NOTE - NSFOLLOWUPINSTRUCTIONS_ED_ALL_ED_FT
Keep well hydrated.   Take zofran as needed for vomiting.  Alternate tylenol or ibuprofen as needed for pain or fever.    Viral Respiratory Infection  A viral respiratory infection is an illness that affects parts of the body used for breathing, like the lungs, nose, and throat. It is caused by a germ called a virus.    ImageSome examples of this kind of infection are:    A cold.  The flu (influenza).  A respiratory syncytial virus (RSV) infection.    How do I know if I have this infection?  Most of the time this infection causes:    A stuffy or runny nose.  Yellow or green fluid in the nose.  A cough.  Sneezing.  Tiredness (fatigue).  Achy muscles.  A sore throat.  Sweating or chills.  A fever.  A headache.    How is this infection treated?  If the flu is diagnosed early, it may be treated with an antiviral medicine. This medicine shortens the length of time a person has symptoms. Symptoms may be treated with over-the-counter and prescription medicines, such as:    Expectorants. These make it easier to cough up mucus.  Decongestant nasal sprays.    Doctors do not prescribe antibiotic medicines for viral infections. They do not work with this kind of infection.    How do I know if I should stay home?  To keep others from getting sick, stay home if you have:    A fever.  A lasting cough.  A sore throat.  A runny nose.  Sneezing.  Muscles aches.  Headaches.  Tiredness.  Weakness.  Chills.  Sweating.  An upset stomach (nausea).    Follow these instructions at home:  Rest as much as possible.  Take over-the-counter and prescription medicines only as told by your doctor.  Drink enough fluid to keep your pee (urine) clear or pale yellow.  Gargle with salt water. Do this 3–4 times per day or as needed. To make a salt–water mixture, dissolve ½–1 tsp of salt in 1 cup of warm water. Make sure the salt dissolves all the way.  Use nose drops made from salt water. This helps with stuffiness (congestion). It also helps soften the skin around your nose.  Do not drink alcohol.  Do not use tobacco products, including cigarettes, chewing tobacco, and e-cigarettes. If you need help quitting, ask your doctor.  Get help if:  Your symptoms last for 10 days or longer.  Your symptoms get worse over time.  You have a fever.  You have very bad pain in your face or forehead.  Parts of your jaw or neck become very swollen.  Get help right away if:  You feel pain or pressure in your chest.  You have shortness of breath.  You faint or feel like you will faint.  You keep throwing up (vomiting).  You feel confused.  This information is not intended to replace advice given to you by your health care provider. Make sure you discuss any questions you have with your health care provider.    Syncope    WHAT YOU NEED TO KNOW:    Syncope is also called fainting or passing out. Syncope is a sudden, temporary loss of consciousness, followed by a fall from a standing or sitting position. Syncope ranges from not serious to a sign of a more serious condition that needs to be treated. You can control some health conditions that cause syncope. Your healthcare providers can help you create a plan to manage syncope and prevent episodes.    DISCHARGE INSTRUCTIONS:    Seek care immediately if:     You are bleeding because you hit your head when you fainted.       You suddenly have double vision, difficulty speaking, numbness, and cannot move your arms or legs.      You have chest pain and trouble breathing.      You vomit blood or material that looks like coffee grounds.      You see blood in your bowel movement.    Contact your healthcare provider if:     You have new or worsening symptoms.      You have another syncope episode.      You have a headache, fast heartbeat, or feel too dizzy to stand up.      You have questions or concerns about your condition or care.    Medicines:     Medicines may be needed to help your heart pump strongly and regularly. Your healthcare provider may also make changes to any medicines that are causing syncope.       Take your medicine as directed. Contact your healthcare provider if you think your medicine is not helping or if you have side effects. Tell him or her if you are allergic to any medicine. Keep a list of the medicines, vitamins, and herbs you take. Include the amounts, and when and why you take them. Bring the list or the pill bottles to follow-up visits. Carry your medicine list with you in case of an emergency.    Follow up with your healthcare provider as directed: Write down your questions so you remember to ask them during your visits.     Manage syncope:     Keep a record of your syncope episodes. Include your symptoms and your activity before and after the episode. The record can help your healthcare provider find the cause of your syncope and help you manage episodes.      Sit or lie down when needed. This includes when you feel dizzy, your throat is getting tight, and your vision changes. Raise your legs above the level of your heart.      Take slow, deep breaths if you start to breathe faster with anxiety or fear. This can help decrease dizziness and the feeling that you might faint.       Check your blood pressure often. This is important if you take medicine to lower your blood pressure. Check your blood pressure when you are lying down and when you are standing. Ask how often to check during the day. Keep a record of your blood pressure numbers. Your healthcare provider may use the record to help plan your treatment.How to take a Blood Pressure         Prevent a syncope episode:     Move slowly and let yourself get used to one position before you move to another position. This is very important when you change from a lying or sitting position to a standing position. Take some deep breaths before you stand up from a lying position. Stand up slowly. Sudden movements may cause a fainting spell. Sit on the side of the bed or couch for a few minutes before you stand up. If you are on bedrest, try to be upright for about 2 hours each day, or as directed. Do not lock your legs if you are standing for a long period of time. Move your legs and bend your knees to keep blood flowing.      Follow your healthcare provider's recommendations. Your provider may recommend that you drink more liquids to prevent dehydration. You may also need to have more salt to keep your blood pressure from dropping too low and causing syncope. Your provider will tell you how much liquid and sodium to have each day. He or she will also tell you how much physical activity is safe for you. This will depend on what is causing your syncope.      Watch for signs of low blood sugar. These include hunger, nervousness, sweating, and fast or fluttery heartbeats. Talk with your healthcare provider about ways to keep your blood sugar level steady.      Do not strain if you are constipated. You may faint if you strain to have a bowel movement. Walking is the best way to get your bowels moving. Eat foods high in fiber to make it easier to have a bowel movement. Good examples are high-fiber cereals, beans, vegetables, and whole-grain breads. Prune juice may help make bowel movements softer.      Be careful in hot weather. Heat can cause a syncope episode. Limit activity done outside on hot days. Physical activity in hot weather can lead to dehydration. This can cause an episode.    Contusion in Adults    WHAT YOU NEED TO KNOW:    A contusion is a bruise that appears on your skin after an injury. A bruise happens when small blood vessels tear but skin does not. When blood vessels tear, blood leaks into nearby tissue, such as soft tissue or muscle.    DISCHARGE INSTRUCTIONS:    Return to the emergency department if:     You have new trouble moving the injured area.      You have tingling or numbness in or near the injured area.      Your hand or foot below the bruise gets cold or turns pale.     Contact your healthcare provider if:     You find a new lump in the injured area.      Your symptoms do not improve with treatment after 4 to 5 days.      You have questions or concerns about your condition or care.    Medicines: You may need any of the following:     NSAIDs help decrease swelling and pain or fever. This medicine is available with or without a doctor's order. NSAIDs can cause stomach bleeding or kidney problems in certain people. If you take blood thinner medicine, always ask your healthcare provider if NSAIDs are safe for you. Always read the medicine label and follow directions.      Prescription pain medicine may be given. Do not wait until the pain is severe before you take your medicine.      Take your medicine as directed. Contact your healthcare provider if you think your medicine is not helping or if you have side effects. Tell him of her if you are allergic to any medicine. Keep a list of the medicines, vitamins, and herbs you take. Include the amounts, and when and why you take them. Bring the list or the pill bottles to follow-up visits. Carry your medicine list with you in case of an emergency.    Follow up with your healthcare provider as directed: You may need to return within a week to check your injury again. Write down your questions so you remember to ask them during your visits.    Help a contusion heal:     Rest the injured area or use it less than usual. If you bruised your leg or foot, you may need crutches or a cane to help you walk. This will help you keep weight off your injured body part.       Apply ice to decrease swelling and pain. Ice may also help prevent tissue damage. Use an ice pack, or put crushed ice in a plastic bag. Cover it with a towel and place it on your bruise for 15 to 20 minutes every hour or as directed.      Use compression to support the area and decrease swelling. Wrap an elastic bandage around the area over the bruised muscle. Make sure the bandage is not too tight. You should be able to fit 1 finger between the bandage and your skin.      Elevate (raise) your injured body part above the level of your heart to help decrease pain and swelling. Use pillows, blankets, or rolled towels to elevate the area as often as you can.      Do not drink alcohol as directed. Alcohol may slow healing.      Do not stretch injured muscles right after your injury. Ask your healthcare provider when and how you may safely stretch after your injury. Gentle stretches can help increase your flexibility.      Do not massage the area or put heating pads on the bruise right after your injury. Heat and massage may slow healing. Your healthcare provider may tell you to apply heat after several days. At that time, heat will start to help the injury heal.    Prevent another contusion:     Stretch and warm up before you play sports or exercise.      Wear protective gear when you play sports. Examples are shin guards and padding.       If you begin a new physical activity, start slowly to give your body a chance to adjust.

## 2024-03-01 NOTE — H&P ADULT - ASSESSMENT
22 yo F with hx of concussion, mitral valve prolapse, appendectomy, ovarian cysts, hx of C section, presents BIB ambulance due to syncope at home.  Patient states she syncopized after multiple episodes of vomiting and diarrhea, then went into the shower and fainted again.  She thinks she hit her head.  She has headache, right sided head and neck pain, right shoulder pain, low back pain, right knee pain and right foot pain.  She states she was in the ED with her son yesterday as he also has vomiting and diarrheal illness.  Diahrrea , vommitting ongoing for 24 hours.  Patient was sick 2 weeks ago with URI / pharyngitis for which she is taking antibioitics.  In ED patient has liquid stool pouring out in bed.   Denies any chest pain, sob, fevers, chills.         #  24 yo F with hx of concussion, mitral valve prolapse, appendectomy, ovarian cysts, hx of C section, presents BIB ambulance due to syncope at home.  Patient states she syncopized after multiple episodes of vomiting and diarrhea, then went into the shower and fainted again.  She thinks she hit her head.  She has headache, right sided head and neck pain, right shoulder pain, low back pain, right knee pain and right foot pain.  She states she was in the ED with her son yesterday as he also has vomiting and diarrheal illness.  Diahrrea , vommitting ongoing for 24 hours.  Patient was sick 2 weeks ago with URI / pharyngitis for which she is taking antibioitics.  In ED patient has liquid stool pouring out in bed.   Denies any chest pain, sob, fevers, chills.         #Enterocolitis with severe liquid diarrhea causing orthostatic hypotension and syncope  -s/p 4L bolus in ED, still orthostatic  -start standing IV fluids and admission  -GI PCR, stool cultures, c.diff given recent antibiotics use  -Zofran  -child who is 15 months is improving at home with patients mother.   -start IV abx until stool studies return      #mild hypocalcemia  - 7.9  corrected  -start oral replacement      # dvt prophy- young patient to ambulate

## 2024-03-01 NOTE — ED PROVIDER NOTE - PROGRESS NOTE DETAILS
Beto DURBIN: Patient had been admitted; now febrile; received 30cc/kg bolus; still with concern for viral etiology; antibiotics held but blood cultures x 2 and lactate ordered; call placed to Dr. Haro- pending return call at this time.

## 2024-03-01 NOTE — H&P ADULT - NSHPLABSRESULTS_GEN_ALL_CORE
Urinalysis Basic - ( 01 Mar 2024 08:32 )    Color: x / Appearance: x / SG: x / pH: x  Gluc: 98 mg/dL / Ketone: x  / Bili: x / Urobili: x   Blood: x / Protein: x / Nitrite: x   Leuk Esterase: x / RBC: x / WBC x   Sq Epi: x / Non Sq Epi: x / Bacteria: x    01 Mar 2024 08:32    142    |  113    |  15     ----------------------------<  98     3.6     |  23     |  0.56     Ca    6.9        01 Mar 2024 08:32  Mg     1.8       01 Mar 2024 02:57    TPro  5.8    /  Alb  2.8    /  TBili  0.4    /  DBili  x      /  AST  25     /  ALT  36     /  AlkPhos  59     01 Mar 2024 08:32  LIVER FUNCTIONS - ( 01 Mar 2024 08:32 )  Alb: 2.8 g/dL / Pro: 5.8 gm/dL / ALK PHOS: 59 U/L / ALT: 36 U/L / AST: 25 U/L / GGT: x         CBC Full  -  ( 01 Mar 2024 08:32 )  WBC Count : 7.57 K/uL  Hemoglobin : 11.7 g/dL  Hematocrit : 35.8 %  Platelet Count - Automated : 261 K/uL  Mean Cell Volume : 89.5 fl  Mean Cell Hemoglobin : 29.3 pg  Mean Cell Hemoglobin Concentration : 32.7 gm/dL  Auto Neutrophil # : 7.00 K/uL  Auto Lymphocyte # : 0.32 K/uL  Auto Monocyte # : 0.19 K/uL  Auto Eosinophil # : 0.01 K/uL  Auto Basophil # : 0.02 K/uL  Auto Neutrophil % : 92.5 %  Auto Lymphocyte % : 4.2 %  Auto Monocyte % : 2.5 %  Auto Eosinophil % : 0.1 %  Auto Basophil % : 0.3 %

## 2024-03-01 NOTE — PHARMACOTHERAPY INTERVENTION NOTE - OUTCOME
accepted
Principal Discharge DX:	Community acquired pneumonia  Goal:	resolution  Instructions for follow-up, activity and diet:	complete course of antibiotics.    resume all other medications as prescribed prior to admission  follow up with PMD

## 2024-03-01 NOTE — ED ADULT NURSE NOTE - OBJECTIVE STATEMENT
Pt presents to the ED AO x 4, s/p syncopal episode while on toilet. Positive headstrike. Positive LOC. Pt reports having cough and sore throat 2 weeks ago and began having n/v/d tonight 2/29. Pt c/o neck, back, R knee, R foot pain. C-collar placed by EMS. Sensory and motor function of all extremities intact. Hypotension noted. Pt reports chest pain, SOB, lightheadedness. Respirations even and unlabored. O2 saturation 100% on RA. Recent sick contact.

## 2024-03-01 NOTE — ED PROVIDER NOTE - CONSTITUTIONAL, MLM
Well appearing, awake, alert, oriented to person, place, time/situation; speaking full sentences. normal...

## 2024-03-01 NOTE — ED ADULT NURSE REASSESSMENT NOTE - COMFORT CARE
assisted to bedside commode/darkened lights/plan of care explained/repositioned/side rails up/warm blanket provided

## 2024-03-01 NOTE — ED PROVIDER NOTE - CLINICAL SUMMARY MEDICAL DECISION MAKING FREE TEXT BOX
24 yo F with viral gastroenteritis, dehydration and syncope.  With closed head injury, muscle strain and contusion to back.  IV tylenol and flexeril given.  IV toradol added after CTs of head and C spine negative for any fracture or ICH.      WBC elevated likely from infectious viral gastroenteritis. No electrolyte abnormality . Negative cardiac enzymes. Will monitor and hydrate and if patient tolerating PO and ambulating, she can f/u with PMD. 22 yo F with viral gastroenteritis, dehydration and syncope.  With closed head injury, muscle strain and contusion to back.  IV tylenol and flexeril given.  IV toradol added after CTs of head and C spine negative for any fracture or ICH.      WBC elevated likely from infectious viral gastroenteritis. No electrolyte abnormality . Negative cardiac enzymes. Will monitor and hydrate and if patient tolerating PO and ambulating, she can f/u with PMD.    Beto DO: Received s/o from Dr. Hopson at 6AM patient still orthostatic; additional CTs ordered- ct chest/a/p- no evidence of traumatic injuries; patient still orthostatic- HR in 130s and sbp in 80s-90s when standing; patient with 2 episodes of diarrhea; re-examined- no tenderness to cervical, thoracic, lumbar spine; strength/sensation intact in upper/lower extremities b/l; rectal exam: good tone; repeat labs had been ordered; correct calcium: 7.8 on CMP; patient to be admitted for orthostatic hypotension likely in setting of gastroenteritis; per Dr. Haro- would like UDS; stool culture; cdiff ordered.

## 2024-03-01 NOTE — ED PROVIDER NOTE - MUSCULOSKELETAL, MLM
Spine appears normal without midline spinal tenderness; +right cervical paraspinal muscle tenderness, range of motion is not limited, TTP right posterior knee and dorsum of right foot.

## 2024-03-01 NOTE — H&P ADULT - HISTORY OF PRESENT ILLNESS
24 yo F with hx of concussion, mitral valve prolapse, appendectomy, ovarian cysts, hx of C section, presents BIB ambulance due to syncope at home.  Patient states she syncopized after multiple episodes of vomiting and diarrhea, then went into the shower and fainted again.  She thinks she hit her head.  She has headache, right sided head and neck pain, right shoulder pain, low back pain, right knee pain and right foot pain.  She states she was in the ED with her son yesterday as he also has vomiting and diarrheal illness.  Diahrrea , vommitting ongoing for 24 hours.  Patient was sick 2 weeks ago with URI / pharyngitis for which she is taking antibioitics.  In ED patient has liquid stool pouring out in bed.   Denies any chest pain, sob, fevers, chills.

## 2024-03-01 NOTE — ED PROVIDER NOTE - OBJECTIVE STATEMENT
Pt. is 24 yo F with hx of concussion, mitral valve prolapse, appendectomy, ovarian cysts, hx of C section, presents BIB ambulance due to syncope at home.  Patient states she syncopized after multiple episodes of vomiting and diarrhea, then went into the shower and fainted again.  She thinks she hit her head.  She has headache, right sided head and neck pain, right shoulder pain, low back pain, right knee pain and right foot pain.  She states she was in the ED with her son yesterday as he also has vomiting and diarrheal illness. Patient arrived on stretcher with C collar in place complaining of pain all over her body.  Patient states her vomiting and diarrhea has been for 1 day, but she was also sick 2 wks ago with URI and pharyngitis. Currently on antibiotics for pharyngitis.  Tested negative for flu, Covid, Strep, and mono.

## 2024-03-01 NOTE — ED ADULT TRIAGE NOTE - CHIEF COMPLAINT QUOTE
pt brought in by EMS from home s/p syncopal episode x2. pt reports she has had flu like symtpoms x2 weeks, was on toilet tonight and had syncope, went into the shower and had another syncopal episode, believes she hit her head unknown length of time on the floor. endorsing HA, chest pain, shortness of breath, neck pain, nausea/vomiting. ems placed pt in c collar. pt sent for ekg. +allergy. MD Hopson made aware

## 2024-03-01 NOTE — ED PROVIDER NOTE - ENMT, MLM
Airway patent, Nasal mucosa clear. Mouth with slightly dry mucosa. Throat has no vesicles, no oropharyngeal exudates and uvula is midline. C collar in place; no midline C spine tenderness

## 2024-03-02 LAB
ALBUMIN SERPL ELPH-MCNC: 2.3 G/DL — LOW (ref 3.3–5)
ALP SERPL-CCNC: 51 U/L — SIGNIFICANT CHANGE UP (ref 40–120)
ALT FLD-CCNC: 29 U/L — SIGNIFICANT CHANGE UP (ref 12–78)
ANION GAP SERPL CALC-SCNC: 4 MMOL/L — LOW (ref 5–17)
AST SERPL-CCNC: 16 U/L — SIGNIFICANT CHANGE UP (ref 15–37)
BILIRUB SERPL-MCNC: 0.2 MG/DL — SIGNIFICANT CHANGE UP (ref 0.2–1.2)
BUN SERPL-MCNC: 6 MG/DL — LOW (ref 7–23)
C DIFF BY PCR RESULT: SIGNIFICANT CHANGE UP
CALCIUM SERPL-MCNC: 7.6 MG/DL — LOW (ref 8.5–10.1)
CHLORIDE SERPL-SCNC: 115 MMOL/L — HIGH (ref 96–108)
CO2 SERPL-SCNC: 24 MMOL/L — SIGNIFICANT CHANGE UP (ref 22–31)
CREAT SERPL-MCNC: 0.42 MG/DL — LOW (ref 0.5–1.3)
CULTURE RESULTS: SIGNIFICANT CHANGE UP
EGFR: 141 ML/MIN/1.73M2 — SIGNIFICANT CHANGE UP
GI PCR PANEL: DETECTED
GLUCOSE SERPL-MCNC: 114 MG/DL — HIGH (ref 70–99)
NOROVIRUS GI+II RNA STL QL NAA+NON-PROBE: DETECTED
POTASSIUM SERPL-MCNC: 3.1 MMOL/L — LOW (ref 3.5–5.3)
POTASSIUM SERPL-SCNC: 3.1 MMOL/L — LOW (ref 3.5–5.3)
PROT SERPL-MCNC: 5 GM/DL — LOW (ref 6–8.3)
SODIUM SERPL-SCNC: 143 MMOL/L — SIGNIFICANT CHANGE UP (ref 135–145)
SPECIMEN SOURCE: SIGNIFICANT CHANGE UP

## 2024-03-02 PROCEDURE — 99233 SBSQ HOSP IP/OBS HIGH 50: CPT

## 2024-03-02 RX ORDER — ACETAMINOPHEN 500 MG
1000 TABLET ORAL ONCE
Refills: 0 | Status: COMPLETED | OUTPATIENT
Start: 2024-03-02 | End: 2024-03-02

## 2024-03-02 RX ORDER — POTASSIUM CHLORIDE 20 MEQ
40 PACKET (EA) ORAL EVERY 4 HOURS
Refills: 0 | Status: COMPLETED | OUTPATIENT
Start: 2024-03-02 | End: 2024-03-02

## 2024-03-02 RX ADMIN — Medication 650 MILLIGRAM(S): at 06:42

## 2024-03-02 RX ADMIN — Medication 40 MILLIEQUIVALENT(S): at 12:09

## 2024-03-02 RX ADMIN — Medication 2 TABLET(S): at 09:06

## 2024-03-02 RX ADMIN — ONDANSETRON 4 MILLIGRAM(S): 8 TABLET, FILM COATED ORAL at 20:30

## 2024-03-02 RX ADMIN — Medication 325 MILLIGRAM(S): at 09:06

## 2024-03-02 RX ADMIN — Medication 650 MILLIGRAM(S): at 17:44

## 2024-03-02 RX ADMIN — Medication 100 MILLIGRAM(S): at 05:38

## 2024-03-02 RX ADMIN — Medication 40 MILLIEQUIVALENT(S): at 17:44

## 2024-03-02 RX ADMIN — Medication 1000 MILLIGRAM(S): at 13:00

## 2024-03-02 RX ADMIN — Medication 400 MILLIGRAM(S): at 12:28

## 2024-03-02 RX ADMIN — Medication 200 MILLIGRAM(S): at 05:33

## 2024-03-02 NOTE — PROGRESS NOTE ADULT - SUBJECTIVE AND OBJECTIVE BOX
HOSPITALIST PROGRESS NOTE    22 yo F with hx of concussion, mitral valve prolapse, appendectomy, ovarian cysts, hx of C section, presents BIB ambulance due to syncope at home.  Patient states she syncopized after multiple episodes of vomiting and diarrhea, then went into the shower and fainted again.  She thinks she hit her head.  She has headache, right sided head and neck pain, right shoulder pain, low back pain, right knee pain and right foot pain.  She states she was in the ED with her son yesterday as he also has vomiting and diarrheal illness.  Diahrrea , vommitting ongoing for 24 hours.  Patient was sick 2 weeks ago with URI / pharyngitis for which she is taking antibioitics.  In ED patient has liquid stool pouring out in bed.   Denies any chest pain, sob, fevers, chills.     SUBJECTIVE / INTERVAL HPI: Patient seen and examined at bedside. No further episodes of N/V/D since yesterday. GI PCR +for Norovirus. Chief complaint at this time is headache; likely multifactorial from post-concussion syndrome vs. lack of caffeine (drinks coffee daily). CT head on admission without acute findings. Besides headache, ROS also positive for intermittent chest pain yesterday (not present now) and "swish" sensation in left ear. Later this afternoon patient also mentioned mild urinary incontinence.      ROS: All 10 systems reviewed and found to be negative with the exception of what has been described above.     VITAL SIGNS:  Vital Signs Last 24 Hrs  T(C): 36.7 (02 Mar 2024 08:20), Max: 37.8 (01 Mar 2024 18:30)  T(F): 98.1 (02 Mar 2024 08:20), Max: 100.1 (01 Mar 2024 18:30)  HR: 96 (02 Mar 2024 08:20) (95 - 106)  BP: 91/61 (02 Mar 2024 08:20) (91/61 - 102/61)  BP(mean): --  RR: 18 (02 Mar 2024 08:20) (18 - 20)  SpO2: 99% (02 Mar 2024 08:20) (97% - 100%)    Parameters below as of 02 Mar 2024 08:20  Patient On (Oxygen Delivery Method): room air    PHYSICAL EXAM:  General: No acute distress, appears well  HEENT: NC/AT; EOMI, anicteric sclera; MMM  Neck: Supple  Cardiovascular: +S1/S2, RRR, no murmurs, rubs, gallops  Respiratory: CTA B/L; no W/R/R  Gastrointestinal: soft, NT/ND; +BSx4  Extremities: WWP; no edema, clubbing or cyanosis  Rectal: Not examined  : Not examined  Neurological: AAOx3; no focal deficits. Moving extremities     MEDICATIONS:  MEDICATIONS  (STANDING):  calcium carbonate 1250 mG  + Vitamin D (OsCal 500 + D) 2 Tablet(s) Oral daily  ferrous    sulfate 325 milliGRAM(s) Oral daily  potassium chloride    Tablet ER 40 milliEquivalent(s) Oral every 4 hours  sodium chloride 0.9%. 1000 milliLiter(s) (100 mL/Hr) IV Continuous <Continuous>    MEDICATIONS  (PRN):  acetaminophen     Tablet .. 650 milliGRAM(s) Oral every 6 hours PRN Temp greater or equal to 38C (100.4F), Mild Pain (1 - 3)  aluminum hydroxide/magnesium hydroxide/simethicone Suspension 30 milliLiter(s) Oral every 4 hours PRN Dyspepsia  melatonin 3 milliGRAM(s) Oral at bedtime PRN Insomnia  ondansetron Injectable 4 milliGRAM(s) IV Push every 8 hours PRN Nausea and/or Vomiting      ALLERGIES:  Allergies    tetanus/diphth/pertuss (Tdap) adult/adol (Seizure)  Nuts (Unknown)  shellfish (Other)  peaches, pears, cherries (Unknown)  apple (Unknown)    Intolerances    lactose (Unknown)      LABS:                        11.7   7.57  )-----------( 261      ( 01 Mar 2024 08:32 )             35.8     03-02    143  |  115<H>  |  6<L>  ----------------------------<  114<H>  3.1<L>   |  24  |  0.42<L>    Ca    7.6<L>      02 Mar 2024 06:47  Mg     1.8     03-01    TPro  5.0<L>  /  Alb  2.3<L>  /  TBili  0.2  /  DBili  x   /  AST  16  /  ALT  29  /  AlkPhos  51  03-02      Urinalysis Basic - ( 02 Mar 2024 06:47 )    Color: x / Appearance: x / SG: x / pH: x  Gluc: 114 mg/dL / Ketone: x  / Bili: x / Urobili: x   Blood: x / Protein: x / Nitrite: x   Leuk Esterase: x / RBC: x / WBC x   Sq Epi: x / Non Sq Epi: x / Bacteria: x      CAPILLARY BLOOD GLUCOSE        RADIOLOGY & ADDITIONAL TESTS: Reviewed.

## 2024-03-02 NOTE — PROGRESS NOTE ADULT - ASSESSMENT
22 yo F with hx of concussion, mitral valve prolapse, appendectomy, ovarian cysts, hx of C section, presents BIB ambulance due to syncope at home. Patient states she syncopized after multiple episodes of vomiting and diarrhea, then went into the shower and fainted again. +head strike. Admitted for syncope workup and management of enterocolitis.     #Enterocolitis, Norovirus   -Diarrhea improving, no further episodes today   -Discontinue antibiotics given positive GI PCR for Norovirus   -Zofran prn  -Blood culture negative to date   -Supportive treatment, encourage hydration    #r/o orthostatic hypotension   #Syncope  -s/p 4L bolus in ED, on standing IV fluids  -Negative orthostatics today 3/2  -Syncopal episode at home likely in setting of volume depletion 2/2 diarrhea   -Trop negative  -Currently following with a Cardiologist outpatient for workup of palpitations - has outpatient echo scheduled and heart monitor ordered, follow up    #Postconcussive syndrome  -s/p head strike prior to admission   -CT head on admission negative   -Supportive care prn  -Outpatient follow up, may last weeks to months     #Hypokalemia  Likely in setting of diarrhea yesterday, replete and monitor    #Urinary incontinence  Reports leaky urine  U/A negative, cultures negative  Monitor signs/symptoms     #mild hypocalcemia  -Corrected calcium today is 8.96  -Started oral replacement on admission    #DVT prophy- young patient to ambulate  DISPO: Patient feeling too weak for discharge today. Will continue hydration, supportive care and plan for d/c tomorrow.

## 2024-03-03 ENCOUNTER — TRANSCRIPTION ENCOUNTER (OUTPATIENT)
Age: 24
End: 2024-03-03

## 2024-03-03 VITALS
SYSTOLIC BLOOD PRESSURE: 116 MMHG | OXYGEN SATURATION: 97 % | DIASTOLIC BLOOD PRESSURE: 72 MMHG | RESPIRATION RATE: 18 BRPM | TEMPERATURE: 98 F | HEART RATE: 75 BPM

## 2024-03-03 LAB
ANION GAP SERPL CALC-SCNC: 6 MMOL/L — SIGNIFICANT CHANGE UP (ref 5–17)
BUN SERPL-MCNC: 3 MG/DL — LOW (ref 7–23)
CALCIUM SERPL-MCNC: 8.2 MG/DL — LOW (ref 8.5–10.1)
CHLORIDE SERPL-SCNC: 111 MMOL/L — HIGH (ref 96–108)
CO2 SERPL-SCNC: 24 MMOL/L — SIGNIFICANT CHANGE UP (ref 22–31)
CREAT SERPL-MCNC: 0.4 MG/DL — LOW (ref 0.5–1.3)
EGFR: 143 ML/MIN/1.73M2 — SIGNIFICANT CHANGE UP
GLUCOSE SERPL-MCNC: 82 MG/DL — SIGNIFICANT CHANGE UP (ref 70–99)
MAGNESIUM SERPL-MCNC: 1.7 MG/DL — SIGNIFICANT CHANGE UP (ref 1.6–2.6)
PHOSPHATE SERPL-MCNC: 1.7 MG/DL — LOW (ref 2.5–4.5)
POTASSIUM SERPL-MCNC: 3.8 MMOL/L — SIGNIFICANT CHANGE UP (ref 3.5–5.3)
POTASSIUM SERPL-SCNC: 3.8 MMOL/L — SIGNIFICANT CHANGE UP (ref 3.5–5.3)
SODIUM SERPL-SCNC: 141 MMOL/L — SIGNIFICANT CHANGE UP (ref 135–145)

## 2024-03-03 PROCEDURE — 99239 HOSP IP/OBS DSCHRG MGMT >30: CPT

## 2024-03-03 PROCEDURE — 93010 ELECTROCARDIOGRAM REPORT: CPT

## 2024-03-03 RX ORDER — ONDANSETRON 8 MG/1
1 TABLET, FILM COATED ORAL
Qty: 12 | Refills: 0 | DISCHARGE

## 2024-03-03 RX ORDER — KETOROLAC TROMETHAMINE 30 MG/ML
15 SYRINGE (ML) INJECTION ONCE
Refills: 0 | Status: DISCONTINUED | OUTPATIENT
Start: 2024-03-03 | End: 2024-03-03

## 2024-03-03 RX ORDER — ONDANSETRON 8 MG/1
1 TABLET, FILM COATED ORAL
Qty: 2 | Refills: 0
Start: 2024-03-03 | End: 2024-03-09

## 2024-03-03 RX ORDER — POTASSIUM PHOSPHATE, MONOBASIC POTASSIUM PHOSPHATE, DIBASIC 236; 224 MG/ML; MG/ML
15 INJECTION, SOLUTION INTRAVENOUS ONCE
Refills: 0 | Status: COMPLETED | OUTPATIENT
Start: 2024-03-03 | End: 2024-03-03

## 2024-03-03 RX ADMIN — Medication 650 MILLIGRAM(S): at 09:53

## 2024-03-03 RX ADMIN — POTASSIUM PHOSPHATE, MONOBASIC POTASSIUM PHOSPHATE, DIBASIC 62.5 MILLIMOLE(S): 236; 224 INJECTION, SOLUTION INTRAVENOUS at 11:44

## 2024-03-03 RX ADMIN — Medication 2 TABLET(S): at 09:53

## 2024-03-03 RX ADMIN — Medication 325 MILLIGRAM(S): at 09:53

## 2024-03-03 RX ADMIN — Medication 15 MILLIGRAM(S): at 11:45

## 2024-03-03 NOTE — DISCHARGE NOTE PROVIDER - NSDCMRMEDTOKEN_GEN_ALL_CORE_FT
amoxicillin 500 mg oral capsule: 1 cap(s) orally 3 times a day  Bacid oral capsule: 1 tab(s) orally once a day  ferrous sulfate 324 mg (65 mg elemental iron) oral tablet: 1 tab(s) orally once a day  Zofran ODT 4 mg oral tablet, disintegratin tab(s) orally 3 times a day as needed for  NV   Bacid oral capsule: 1 tab(s) orally once a day  ferrous sulfate 324 mg (65 mg elemental iron) oral tablet: 1 tab(s) orally once a day  Zofran ODT 4 mg oral tablet, disintegratin tab(s) orally 3 times a day as needed for  NV

## 2024-03-03 NOTE — DISCHARGE NOTE NURSING/CASE MANAGEMENT/SOCIAL WORK - PATIENT PORTAL LINK FT
You can access the FollowMyHealth Patient Portal offered by [facility] by registering at the following website: [email or web address]. By joining [employer]’s FollowMyHealth portal, you will also be able to view your health information using other applications (apps) compatible with our system.

## 2024-03-03 NOTE — DISCHARGE NOTE PROVIDER - NSTOBACCOUSAGEY/N_GEN_A_CS
PRE-SEDATION ASSESSMENT    CONSENT  Risks, benefits, and alternatives have been discussed with the patient/patient representative, and patient/patient representative agrees to proceed: Yes    MEDICAL HISTORY  Significant medical/surgical history: Yes (right sided pneumothorax)    PHYSICAL EXAM  History and Physical Reviewed: Patient has valid H&P within 30 days. I have reviewed and there are no changes.  Airway Risk History: No previous complications  Airway Anatomy : Class II  Heart : Normal  Lungs : Normal  LOC/Mental Status : Normal    OTHER FINDINGS  Reviewed current medications and allergies: Yes  Pertinent lab/diagnostic test reviewed: Yes    SEDATION RISK ASSESSMENT  Risk Status ASA: Class II - Normal patient with mild systemic disease  Plan for Sedation: Moderate Sedation  Indications for Procedure/Pre-Procedure Diagnosis and Planned Procedure: jesús tube placement  EKG Monitoring: Yes    NARRATIVE FINDINGS      No

## 2024-03-03 NOTE — DISCHARGE NOTE NURSING/CASE MANAGEMENT/SOCIAL WORK - NSDCPEFALRISK_GEN_ALL_CORE
For information on Fall & Injury Prevention, visit: [email or web address] OR  [email or web address] OR  https://www.cdc.gov/steadi/patient.html

## 2024-03-03 NOTE — DISCHARGE NOTE PROVIDER - NSDCFUADDAPPT_GEN_ALL_CORE_FT
Please follow up with your Primary Care Doctor for routine post-discharge follow up within 1-2 weeks.

## 2024-03-03 NOTE — DISCHARGE NOTE PROVIDER - HOSPITAL COURSE
incomplete note  incomplete note    HPI: 24 yo F with hx of concussion, mitral valve prolapse, appendectomy, ovarian cysts, hx of C section, presents BIB ambulance due to syncope at home.  Patient states she syncopized after multiple episodes of vomiting and diarrhea, then went into the shower and fainted again.  She thinks she hit her head. She has headache, right sided head and neck pain, right shoulder pain, low back pain, right knee pain and right foot pain.  She states she was in the ED with her son yesterday as he also has vomiting and diarrheal illness. Diarrhea, vomiting ongoing for 24 hours. Patient was sick 2 weeks ago with URI / pharyngitis for which she is taking antibiotics.  In ED patient has liquid stool pouring out in bed.   Denies any chest pain, sob, fevers, chills. Admitted for syncope workup and management of enterocolitis. Found to have +Norovirus     3/3: Orthostatics normal. BP improving.     Hospital course (by problem):   #Enterocolitis, Norovirus   -Diarrhea improving, no further episodes today   -Discontinue antibiotics given positive GI PCR for Norovirus   -Zofran prn  -Blood culture negative to date   -Supportive treatment, encourage hydration    #r/o orthostatic hypotension   #Syncope  -s/p 4L bolus in ED, on standing IV fluids  -Negative orthostatics today 3/2  -Syncopal episode at home likely in setting of volume depletion 2/2 diarrhea   -Trop negative  -Currently following with a Cardiologist outpatient for workup of palpitations - has outpatient echo scheduled and heart monitor ordered, follow up    #Postconcussive syndrome  -s/p head strike prior to admission   -CT head on admission negative   -Supportive care prn  -Outpatient follow up, may last weeks to months     #Hypokalemia  #Hypophosphatemia  Likely in setting of diarrhea  Replete     #Urinary incontinence  Reports leaky urine  U/A negative, cultures negative  Monitor signs/symptoms     #mild hypocalcemia  -Corrected calcium today is 8.96  -Started oral replacement on admission    Patient was discharged to: Home    Items to follow up as outpatient: Cardiology workup, echo, ziopatch     Physical exam at the time of discharge:  LOS: 2d    VITALS:   T(C): 36.8 (03-03-24 @ 09:27), Max: 36.8 (03-02-24 @ 16:19)  HR: 72 (03-03-24 @ 09:27) (72 - 77)  BP: 117/73 (03-03-24 @ 09:27) (112/65 - 117/73)  RR: 18 (03-03-24 @ 09:27) (18 - 18)  SpO2: 97% (03-03-24 @ 09:27) (97% - 99%)    PHYSICAL EXAM:  General: No acute distress, appears well  HEENT: NC/AT; EOMI, anicteric sclera; MMM  Neck: Supple  Cardiovascular: +S1/S2, RRR, no murmurs, rubs, gallops  Respiratory: CTA B/L; no W/R/R  Gastrointestinal: soft, NT/ND; +BSx4  Extremities: WWP; no edema, clubbing or cyanosis  Rectal: Not examined  : Not examined  Neurological: AAOx3; no focal deficits. Moving extremities          HPI: 24 yo F with hx of concussion, mitral valve prolapse, appendectomy, ovarian cysts, hx of C section, presents BIB ambulance due to syncope at home.  Patient states she syncopized after multiple episodes of vomiting and diarrhea, then went into the shower and fainted again.  She thinks she hit her head. She has headache, right sided head and neck pain, right shoulder pain, low back pain, right knee pain and right foot pain.  She states she was in the ED with her son yesterday as he also has vomiting and diarrheal illness. Diarrhea, vomiting ongoing for 24 hours. Patient was sick 2 weeks ago with URI / pharyngitis for which she is taking antibiotics.  In ED patient has liquid stool pouring out in bed.   Denies any chest pain, sob, fevers, chills. Admitted for syncope workup and management of enterocolitis. Found to have +Norovirus     3/3: Orthostatics normal. BP improving. Denies further diarrhea - had a formed BM this morning. ROS only positive for headache and right sided neck pain radiating anterior chest wall. Headache is better with dimmed lights. IV Phosphate ordered for hypophosphatemia this morning. Stable for discharge home with supportive care.     Hospital course (by problem):   #Enterocolitis, Norovirus   -Diarrhea improving, no further episodes since admission. Had formed BM this morning   -Discontinue antibiotics given positive GI PCR for Norovirus   -Zofran prn  -Blood culture negative to date   -Supportive treatment, encourage hydration    #r/o orthostatic hypotension   #Syncope  -s/p 4L bolus in ED, on standing IV fluids while inpatient  -Negative orthostatics 3/2  -Syncopal episode at home likely in setting of volume depletion 2/2 diarrhea   -Trop negative. EKG on admission with T wave changes in anterior leads, which are no longer present on repeat EKG on 3/3  -Currently following with a Cardiologist outpatient for workup of palpitations - has outpatient echo scheduled, heart monitor ordered, and ?stress test. follow up outpatient    #Postconcussive syndrome  #Cervical pain radiating to chest  -s/p head strike prior to admission   -CT head on admission negative   -Supportive care prn  -Chest pain is described as referred pain from right side of neck. Less likely cardiac. R trapezius is tender to palpation causing referred pain.   -Tylenol/Ibuprofen prn  -Outpatient follow up, may last weeks to months     #Hypokalemia  #Hypophosphatemia  #Mild hypocalcemia  -Corrected Ca 9.56 on 3/3  Electrolyte imbalances likely in setting of diarrhea  Replete     #Urinary incontinence  RESOLVED  U/A negative, cultures negative    Patient was discharged to: Home    Items to follow up as outpatient: Cardiology workup, echo, ziopatch     Physical exam at the time of discharge:  LOS: 2d    VITALS:   T(C): 36.8 (03-03-24 @ 09:27), Max: 36.8 (03-02-24 @ 16:19)  HR: 72 (03-03-24 @ 09:27) (72 - 77)  BP: 117/73 (03-03-24 @ 09:27) (112/65 - 117/73)  RR: 18 (03-03-24 @ 09:27) (18 - 18)  SpO2: 97% (03-03-24 @ 09:27) (97% - 99%)    PHYSICAL EXAM:  General: No acute distress, appears well  HEENT: NC/AT; EOMI, anicteric sclera; MMM  Neck: Supple  Cardiovascular: +S1/S2, RRR, no murmurs, rubs, gallops  Respiratory: CTA B/L; no W/R/R  Gastrointestinal: soft, NT/ND; +BSx4  Extremities: WWP; no edema, clubbing or cyanosis  Rectal: Not examined  : Not examined  Neurological: AAOx3; no focal deficits. Moving extremities   MSK: Mild tenderness to palpation over right trapezius muscle

## 2024-03-03 NOTE — DISCHARGE NOTE PROVIDER - CARE PROVIDER_API CALL
BHARAT SHAVER DO  [address] [place] [address]  Phone: [phone]  Fax: [phone]  Established Patient  Follow Up Time: 2 weeks

## 2024-03-03 NOTE — DISCHARGE NOTE PROVIDER - NSDCCPCAREPLAN_GEN_ALL_CORE_FT
PRINCIPAL DISCHARGE DIAGNOSIS  Diagnosis: Norovirus  Assessment and Plan of Treatment: Norovirus infection can cause severe vomiting and diarrhea that start suddenly. Noroviruses are highly contagious. They commonly spread through food or water that is contaminated during preparation or through contaminated surfaces. Noroviruses can also spread through close contact with a person who has norovirus infection.  Diarrhea, stomach pain and vomiting typically begin 12 to 48 hours after exposure. Norovirus infection symptoms usually last 1 to 3 days. Most people recover completely without treatment.   Please continue supportive care at home, including adequate hydration.      SECONDARY DISCHARGE DIAGNOSES  Diagnosis: Syncope  Assessment and Plan of Treatment: What is syncope? Syncope is also called fainting or passing out. Syncope is a sudden, temporary loss of consciousness, followed by a fall from a standing or sitting position. A syncope episode is usually short.  What causes syncope? Syncope is caused by a decrease in blood flow to the brain. When blood flow to the brain decreases, oxygen to the brain also decreases.   Your syncopal episode was likely from dehydration in setting of your diarrhea.   Continue your current outpatient workup with your Cardiologist to obtain an echocardiogram and heart monitor to rule out other causes of syncope.     PRINCIPAL DISCHARGE DIAGNOSIS  Diagnosis: Norovirus  Assessment and Plan of Treatment: Norovirus is a group of viruses that causes severe vomiting and diarrhea. It’s a very common illness and it’s very contagious. They commonly spread through food or water that is contaminated during preparation or through contaminated surfaces. Noroviruses can also spread through close contact with a person who has norovirus infection.  Diarrhea, stomach pain and vomiting typically begin 12 to 48 hours after exposure. Norovirus infection symptoms usually last 1 to 3 days.   With norovirus, you're contagious when you begin to feel ill.   Although you typically feel better after a day or two, you're contagious for a few days after you recover. The virus can remain in your stool for up to two weeks or more after recovery.  Please continue supportive care at home, including adequate hydration.  https://www.cdc.gov/norovirus/about/prevention.html  Refer to website above for detailed instructions on Norovirus prevention.  Please follow up with your Primary Care Doctor within 10 days of discharge for post-hospital follow up. You will need a repeat metabolic panel at that time to ensure your electrolyte levels are normal.      SECONDARY DISCHARGE DIAGNOSES  Diagnosis: Syncope  Assessment and Plan of Treatment: What is syncope? Syncope is also called fainting or passing out. Syncope is a sudden, temporary loss of consciousness, followed by a fall from a standing or sitting position. A syncope episode is usually short.  What causes syncope? Syncope is caused by a decrease in blood flow to the brain. When blood flow to the brain decreases, oxygen to the brain also decreases.   Your syncopal episode was likely from dehydration in setting of your diarrhea.   Continue your current outpatient workup with your Cardiologist to obtain an echocardiogram and heart monitor to rule out other causes of syncope.      Diagnosis: Acute headache due to traumatic injury of head  Assessment and Plan of Treatment: Mild head injury after a fall can cause a range of concussion symptoms including headache, dizziness, nausea, confusion or an inability to process or retain information, sensitivity to light, and vision distortion.  We obtained a computerized tomography (CT) scan of your head while in the hospital, which was normal.   You can take Tylenol 500 mg 1 to 2 pills every 4 to 6 hours (max dosage 4,000 mg in 24 hours) as needed for headache relief in the next couple of days.   --Avoid activities that can put you at risk for another injury to your head   --Limit screen time and loud music before bed, sleep in a dark room, and keep to a fixed bedtime and wake-up schedule  If your headaches don’t get better or your pain gets worse, see your doctor or health care provider right away.

## 2024-03-03 NOTE — DISCHARGE NOTE PROVIDER - NSDCCPTREATMENT_GEN_ALL_CORE_FT
PRINCIPAL PROCEDURE  Procedure: CT head  Findings and Treatment: CT head: There is no CT evidence of acute intracranial hemorrhage, mass effect or midline shift.  There is no acute loss of gray matter-white matter differentiation.  The ventricles and sulci are normal in size and configuration.  The visualized paranasal sinuses are clear.  The mastoids are clear bilaterally.  The calvarium and skull base appear within normal limits.  CT cervical spine:  There is straightening of the cervicallordosis, likely related to   positioning and/or spasm.  There is no CT evidence of cervical spine fracture or traumatic   malalignment.  There is no suspicious osseous lesion.  There is no paraspinous soft tissue abnormality.  Degenerative changes:  No clinically significant degenerative changes, spinal canal stenosis or neuroforaminal narrowing is visualized by CT technique.  Incidental findings:  None.  IMPRESSION:  CT Head:  No CT evidence of acute intracranial pathology.  CT Cervical Spine: No CT evidence of cervical spine fracture, traumatic malalignment or suspicious osseous lesion.

## 2024-03-06 LAB
CULTURE RESULTS: SIGNIFICANT CHANGE UP
CULTURE RESULTS: SIGNIFICANT CHANGE UP
SPECIMEN SOURCE: SIGNIFICANT CHANGE UP
SPECIMEN SOURCE: SIGNIFICANT CHANGE UP

## 2024-03-08 DIAGNOSIS — E87.6 HYPOKALEMIA: ICD-10-CM

## 2024-03-08 DIAGNOSIS — A08.11 ACUTE GASTROENTEROPATHY DUE TO NORWALK AGENT: ICD-10-CM

## 2024-03-08 DIAGNOSIS — Z91.018 ALLERGY TO OTHER FOODS: ICD-10-CM

## 2024-03-08 DIAGNOSIS — E83.51 HYPOCALCEMIA: ICD-10-CM

## 2024-03-08 DIAGNOSIS — A08.8 OTHER SPECIFIED INTESTINAL INFECTIONS: ICD-10-CM

## 2024-03-08 DIAGNOSIS — E86.0 DEHYDRATION: ICD-10-CM

## 2024-03-08 DIAGNOSIS — K52.9 NONINFECTIVE GASTROENTERITIS AND COLITIS, UNSPECIFIED: ICD-10-CM

## 2024-03-08 DIAGNOSIS — Z91.013 ALLERGY TO SEAFOOD: ICD-10-CM

## 2024-03-08 DIAGNOSIS — F07.81 POSTCONCUSSIONAL SYNDROME: ICD-10-CM

## 2024-03-08 DIAGNOSIS — E83.39 OTHER DISORDERS OF PHOSPHORUS METABOLISM: ICD-10-CM

## 2024-03-08 DIAGNOSIS — R55 SYNCOPE AND COLLAPSE: ICD-10-CM

## 2024-03-08 DIAGNOSIS — R32 UNSPECIFIED URINARY INCONTINENCE: ICD-10-CM

## 2024-10-10 ENCOUNTER — NON-APPOINTMENT (OUTPATIENT)
Age: 24
End: 2024-10-10

## 2024-11-18 ENCOUNTER — APPOINTMENT (OUTPATIENT)
Dept: ORTHOPEDIC SURGERY | Facility: CLINIC | Age: 24
End: 2024-11-18
Payer: COMMERCIAL

## 2024-11-18 DIAGNOSIS — M75.52 BURSITIS OF LEFT SHOULDER: ICD-10-CM

## 2024-11-18 DIAGNOSIS — M79.18 MYALGIA, OTHER SITE: ICD-10-CM

## 2024-11-18 DIAGNOSIS — G25.89 OTHER SPECIFIED EXTRAPYRAMIDAL AND MOVEMENT DISORDERS: ICD-10-CM

## 2024-11-18 PROCEDURE — 99204 OFFICE O/P NEW MOD 45 MIN: CPT

## 2024-11-18 RX ORDER — METHYLPREDNISOLONE 4 MG/1
4 TABLET ORAL
Qty: 1 | Refills: 0 | Status: ACTIVE | COMMUNITY
Start: 2024-11-18 | End: 1900-01-01

## 2024-11-18 RX ORDER — CYCLOBENZAPRINE HYDROCHLORIDE 5 MG/1
5 TABLET, FILM COATED ORAL
Qty: 30 | Refills: 0 | Status: ACTIVE | COMMUNITY
Start: 2024-11-18 | End: 1900-01-01

## 2024-12-12 ENCOUNTER — NON-APPOINTMENT (OUTPATIENT)
Age: 24
End: 2024-12-12